# Patient Record
Sex: MALE | URBAN - METROPOLITAN AREA
[De-identification: names, ages, dates, MRNs, and addresses within clinical notes are randomized per-mention and may not be internally consistent; named-entity substitution may affect disease eponyms.]

---

## 2018-07-13 ENCOUNTER — APPOINTMENT (RX ONLY)
Dept: URBAN - METROPOLITAN AREA CLINIC 114 | Facility: CLINIC | Age: 23
Setting detail: DERMATOLOGY
End: 2018-07-13

## 2018-07-13 VITALS — WEIGHT: 190 LBS | HEIGHT: 72 IN

## 2018-07-13 DIAGNOSIS — L20.89 OTHER ATOPIC DERMATITIS: ICD-10-CM

## 2018-07-13 PROBLEM — L701 OTHER ACNE: Status: ACTIVE | Noted: 2018-07-13

## 2018-07-13 PROBLEM — L259 CONTACT DERMATITIS AND OTHER ECZEMA, UNSPECIFIED CAUSE: Status: ACTIVE | Noted: 2018-07-13

## 2018-07-13 PROBLEM — L21.9 SEBORRHEIC DERMATITIS, UNSPECIFIED: Status: ACTIVE | Noted: 2018-07-13

## 2018-07-13 PROBLEM — L2082 OTHER ATOPIC DERMATITIS AND RELATED CONDITIONS: Status: ACTIVE | Noted: 2018-07-13

## 2018-07-13 PROBLEM — L70.8 OTHER ACNE: Status: ACTIVE | Noted: 2018-07-13

## 2018-07-13 PROBLEM — L700 OTHER ACNE: Status: ACTIVE | Noted: 2018-07-13

## 2018-07-13 PROBLEM — L708 OTHER ACNE: Status: ACTIVE | Noted: 2018-07-13

## 2018-07-13 PROBLEM — L20.84 INTRINSIC (ALLERGIC) ECZEMA: Status: ACTIVE | Noted: 2018-07-13

## 2018-07-13 PROBLEM — L2089 OTHER ATOPIC DERMATITIS AND RELATED CONDITIONS: Status: ACTIVE | Noted: 2018-07-13

## 2018-07-13 PROBLEM — L200 OTHER ATOPIC DERMATITIS AND RELATED CONDITIONS: Status: ACTIVE | Noted: 2018-07-13

## 2018-07-13 PROBLEM — L2081 OTHER ATOPIC DERMATITIS AND RELATED CONDITIONS: Status: ACTIVE | Noted: 2018-07-13

## 2018-07-13 PROBLEM — L2084 OTHER ATOPIC DERMATITIS AND RELATED CONDITIONS: Status: ACTIVE | Noted: 2018-07-13

## 2018-07-13 PROCEDURE — ? ADDITIONAL NOTES

## 2018-07-13 PROCEDURE — ? PRESCRIPTION

## 2018-07-13 PROCEDURE — ? COUNSELING

## 2018-07-13 PROCEDURE — 99213 OFFICE O/P EST LOW 20 MIN: CPT

## 2018-07-13 RX ORDER — DESOXIMETASONE 2.5 MG/G
OINTMENT TOPICAL
Qty: 1 | Refills: 2 | Status: ERX | COMMUNITY
Start: 2018-07-13

## 2018-07-13 RX ADMIN — DESOXIMETASONE: 2.5 OINTMENT TOPICAL at 16:10

## 2018-07-13 ASSESSMENT — LOCATION SIMPLE DESCRIPTION DERM
LOCATION SIMPLE: RIGHT FOREHEAD
LOCATION SIMPLE: RIGHT UPPER BACK

## 2018-07-13 ASSESSMENT — LOCATION DETAILED DESCRIPTION DERM
LOCATION DETAILED: RIGHT FOREHEAD
LOCATION DETAILED: RIGHT SUPERIOR MEDIAL UPPER BACK
LOCATION DETAILED: RIGHT INFERIOR UPPER BACK

## 2018-07-13 ASSESSMENT — LOCATION ZONE DERM
LOCATION ZONE: TRUNK
LOCATION ZONE: FACE

## 2018-07-27 ENCOUNTER — APPOINTMENT (RX ONLY)
Dept: URBAN - METROPOLITAN AREA CLINIC 114 | Facility: CLINIC | Age: 23
Setting detail: DERMATOLOGY
End: 2018-07-27

## 2018-07-27 VITALS — HEIGHT: 72 IN | WEIGHT: 190 LBS

## 2018-07-27 DIAGNOSIS — L20.89 OTHER ATOPIC DERMATITIS: ICD-10-CM | Status: IMPROVED

## 2018-07-27 PROBLEM — L20.84 INTRINSIC (ALLERGIC) ECZEMA: Status: ACTIVE | Noted: 2018-07-27

## 2018-07-27 PROCEDURE — 99213 OFFICE O/P EST LOW 20 MIN: CPT

## 2018-07-27 PROCEDURE — ? TREATMENT REGIMEN

## 2018-07-27 PROCEDURE — ? COUNSELING

## 2018-07-27 ASSESSMENT — LOCATION ZONE DERM
LOCATION ZONE: FACE
LOCATION ZONE: TRUNK

## 2018-07-27 ASSESSMENT — LOCATION SIMPLE DESCRIPTION DERM
LOCATION SIMPLE: CHEST
LOCATION SIMPLE: LEFT CHEEK
LOCATION SIMPLE: RIGHT UPPER BACK
LOCATION SIMPLE: RIGHT CHEEK

## 2018-07-27 ASSESSMENT — LOCATION DETAILED DESCRIPTION DERM
LOCATION DETAILED: STERNUM
LOCATION DETAILED: RIGHT CENTRAL MALAR CHEEK
LOCATION DETAILED: RIGHT MEDIAL UPPER BACK
LOCATION DETAILED: LEFT INFERIOR CENTRAL MALAR CHEEK

## 2018-07-27 NOTE — PROCEDURE: TREATMENT REGIMEN
Continue Regimen: Topicort and bleach baths for flares
Plan: Mentioned Dupixent if flares
Detail Level: Zone